# Patient Record
Sex: FEMALE | Race: WHITE | NOT HISPANIC OR LATINO | URBAN - METROPOLITAN AREA
[De-identification: names, ages, dates, MRNs, and addresses within clinical notes are randomized per-mention and may not be internally consistent; named-entity substitution may affect disease eponyms.]

---

## 2021-01-18 ENCOUNTER — TELEPHONE (OUTPATIENT)
Dept: CARDIOLOGY | Facility: MEDICAL CENTER | Age: 51
End: 2021-01-18

## 2021-01-18 NOTE — TELEPHONE ENCOUNTER
TO: 3:30p/Mon/Ofc  NM: Alexa Roblero    PH: [phone]   PT NM: Alexa Roblero   : [birth year]   REG DR: Dr Her    RE: Returning [facility]'s call regarding   appt had questions and needs to  get directions to [place] office    DISP HIST: 2021 03:08P TO  P/disp  2021 03:09P  checked

## 2021-01-19 NOTE — TELEPHONE ENCOUNTER
Returned new patient call in regards to her appointment with  on 01/20/21 @ 2:00pm to ask about her previous cardiologist,if she has recent blood work,If she has had any cardiac testing, and to let her know she will have an EKG on her appointment date. No answer, left voicemail with a callback number.

## 2021-01-20 ENCOUNTER — OFFICE VISIT (OUTPATIENT)
Dept: CARDIOLOGY | Facility: PHYSICIAN GROUP | Age: 51
End: 2021-01-20
Payer: COMMERCIAL

## 2021-01-20 VITALS
WEIGHT: 112.4 LBS | BODY MASS INDEX: 20.68 KG/M2 | HEART RATE: 100 BPM | HEIGHT: 62 IN | OXYGEN SATURATION: 99 % | SYSTOLIC BLOOD PRESSURE: 116 MMHG | DIASTOLIC BLOOD PRESSURE: 70 MMHG

## 2021-01-20 DIAGNOSIS — Z82.49 FAMILY HISTORY OF ATRIAL FIBRILLATION: ICD-10-CM

## 2021-01-20 DIAGNOSIS — R00.2 PALPITATIONS: ICD-10-CM

## 2021-01-20 DIAGNOSIS — R07.89 ATYPICAL CHEST PAIN: ICD-10-CM

## 2021-01-20 DIAGNOSIS — I34.1 MVP (MITRAL VALVE PROLAPSE): ICD-10-CM

## 2021-01-20 PROCEDURE — 99204 OFFICE O/P NEW MOD 45 MIN: CPT | Performed by: INTERNAL MEDICINE

## 2021-01-20 RX ORDER — BETAMETHASONE DIPROPIONATE 0.5 MG/G
OINTMENT, AUGMENTED TOPICAL 2 TIMES DAILY
COMMUNITY
End: 2023-05-22

## 2021-01-20 SDOH — HEALTH STABILITY: MENTAL HEALTH: HOW OFTEN DO YOU HAVE A DRINK CONTAINING ALCOHOL?: NEVER

## 2021-01-20 NOTE — PATIENT INSTRUCTIONS
-Most heart palpitations are benign. Most people have either PVCs (premature ventricular contractions) or PACs (premature atrial contractions) which are extra beats coming from somewhere in the heart.    Many people do not notice these extra beats.  Some people noticed them and are either considered a nuisance or cause someone to feel very alarmed.    It is most important to recognize if there is a rhythm change of the heart, this would be something sustained for more than 30 seconds, can last for hours. If your heart rate increases significantly for more than 3 hours, consider going to the ED.    Sometimes I recommend a heart monitor to evaluate the electrical system of the heart. Based on what you are telling me today, I don't think we need a monitor right now, but low threshold to get a monitor if things change.    -Echocardiogram- heart pictures to look at the heart structure and pump function, for you the mitral valve.    -You should always hear results of testing within 5 days with my interpretation, if you do not, send a mymxlog message or call the office: [phone].

## 2021-01-20 NOTE — PROGRESS NOTES
"Subjective:   Chief Complaint:   Chief Complaint   Patient presents with   • Chest Pain       \"Flavia\" Alexa Roblero is a 50 y.o. female who is self-referred to establish with a [facility] cardiologist for mitral valve prolapse, palpitations.    Previously followed at [facility] with Dr. Feliciano.    Was seeing cardiology for Lyme disease, was in [place] 12 years.   Was told MVP.    Then started feeling palpitations.  Had stress test, nuclear, treadmill, monitor remotely.  They are quick and sudden, go away pretty fast, does cough sometimes.  Only a few beats then gone.  Was happening daily but not as much recently, most often when she lies down at night, sometimes during the day.    No caffeine overuse. 2 cups per day.    Intermittent atypical CP, used to have it, had been years, feels like a mild throbbing pain, only a few seconds.    Was told it was minor MVP, stopped Abx prior to dental work.  Was told ECG abnormal.    She is not limited by chest pain, pressure or tightness with activity.   No significant dyspnea on exertion, orthopnea or lower extremity swelling.   No significant lightheadedness, or presyncope/syncope.   No symptoms of leg claudication.   No stroke/TIA like symptoms.    No prior hypertension.  No prior hyperlipidemia.  No family history of premature coronary artery disease.  Father had MI at 74, was in [facility],  during that admission.  Mother with afib in her 80s.  Brother born with hole in heart.  No prior smoking history.  No history of diabetes.  No history of autoimmune disease such as lupus or rheumatoid arthritis.  No chronic kidney disease.  No ETOH overuse.  No recreation substance use.  No hx asthma.    Living in [facility], now [place].    DATA REVIEWED by me:  ECG (my personal interpretation) 21  Sinus 97, rightward axis, rsr' pattern.    Echo pending    Most recent labs:       No results found for: WBC, HEMOGLOBIN, HEMATOCRIT, MCV, INR, TSH   Lab Results   Component " Value Date/Time    GLUCOSE TNP 10/18/2018 02:07 PM      No results found for: ASTSGOT, ALTSGPT, ALBUMIN   No results found for: CHOLSTRLTOT, LDL, HDL, TRIGLYCERIDE  No results for input(s): NTPROBNP, TROPONINT in the last 72 hours.      History reviewed. No pertinent past medical history.  History reviewed. No pertinent surgical history.  Family History   Problem Relation Age of Onset   • Heart Disease Father         MI at 74,  of complications   • Heart Disease Mother         Afib   • Heart Disease Brother         Born with hole in heart     Social History     Socioeconomic History   • Marital status:      Spouse name: Not on file   • Number of children: Not on file   • Years of education: Not on file   • Highest education level: Not on file   Occupational History   • Not on file   Social Needs   • Financial resource strain: Not on file   • Food insecurity     Worry: Not on file     Inability: Not on file   • Transportation needs     Medical: Not on file     Non-medical: Not on file   Tobacco Use   • Smoking status: Never Smoker   • Smokeless tobacco: Never Used   Substance and Sexual Activity   • Alcohol use: Never     Frequency: Never   • Drug use: Never   • Sexual activity: Not on file   Lifestyle   • Physical activity     Days per week: Not on file     Minutes per session: Not on file   • Stress: Not on file   Relationships   • Social connections     Talks on phone: Not on file     Gets together: Not on file     Attends Restorationism service: Not on file     Active member of club or organization: Not on file     Attends meetings of clubs or organizations: Not on file     Relationship status: Not on file   • Intimate partner violence     Fear of current or ex partner: Not on file     Emotionally abused: Not on file     Physically abused: Not on file     Forced sexual activity: Not on file   Other Topics Concern   • Not on file   Social History Narrative   • Not on file     Allergies   Allergen Reactions  "  • Penicillins Hives       Current Outpatient Medications   Medication Sig Dispense Refill   • augmented betamethasone dipropionate (DIPROLENE-AF) 0.05 % ointment Apply  topically 2 times a day.       No current facility-administered medications for this visit.        ROS  All others systems reviewed and negative.     Objective:     /70 (BP Location: Left arm, Patient Position: Sitting, BP Cuff Size: Adult)   Pulse 100   Ht 1.575 m (5' 2\")   Wt 51 kg (112 lb 6.4 oz)   SpO2 99%  Body mass index is 20.56 kg/m².    General: No acute distress. Well nourished.  HEENT: EOM grossly intact, no scleral icterus, no pharyngeal erythema.   Neck:  No JVD, no bruits, trachea midline  CVS: RRR. Normal S1, S2. No M/R/G. No LE edema.  2+ radial pulses, 2+ PT pulses  Resp: CTAB. No wheezing or crackles/rhonchi. Normal respiratory effort.  Abdomen: Soft, NT, no gracie hepatomegaly.  MSK/Ext: No clubbing or cyanosis.  Skin: Warm and dry, no rashes.  Neurological: CN III-XII grossly intact. No focal deficits.   Psych: A&O x 3, appropriate affect, good judgement        Assessment:     1. MVP (mitral valve prolapse)  EKG    EC-ECHOCARDIOGRAM COMPLETE W/O CONT   2. Palpitations  REFERRAL TO FOLLOW-UP WITH PRIMARY CARE   3. Atypical chest pain     4. Family history of atrial fibrillation         Medical Decision Making:  Today's Assessment / Status / Plan:     -Needs echo for MVP, has been over 5 years  -CP is atypical and short, not concerning  -Palpitations are not bothersome, if they change, then monitor  -Mother had afib but not until at least 70s.  -Records requested from [facility]  -Needs PCP for routine blood work  -If echo ok, RTC 1 year, will use [facility]      Written instructions given today:    -Most heart palpitations are benign. Most people have either PVCs (premature ventricular contractions) or PACs (premature atrial contractions) which are extra beats coming from somewhere in the heart.    Many people do not " notice these extra beats.  Some people noticed them and are either considered a nuisance or cause someone to feel very alarmed.    It is most important to recognize if there is a rhythm change of the heart, this would be something sustained for more than 30 seconds, can last for hours. If your heart rate increases significantly for more than 3 hours, consider going to the ED.    Sometimes I recommend a heart monitor to evaluate the electrical system of the heart. Based on what you are telling me today, I don't think we need a monitor right now, but low threshold to get a monitor if things change.    -Echocardiogram- heart pictures to look at the heart structure and pump function, for you the mitral valve.    -You should always hear results of testing within 5 days with my interpretation, if you do not, send a Jumo message or call the office: [phone].    Return in about 1 year (around 1/20/2022).    It is my pleasure to participate in the care of Ms. Roblero.  Please do not hesitate to contact me with questions or concerns.    Dotty Her MD, [facility]  Cardiologist [facility]    Please note that this dictation was created using voice recognition software. I have made every reasonable attempt to correct obvious errors, but it is possible there are errors of grammar and possibly content that I did not discover before finalizing the note.

## 2023-05-22 PROBLEM — Z76.89 ENCOUNTER TO ESTABLISH CARE: Status: ACTIVE | Noted: 2023-05-22
